# Patient Record
Sex: MALE | Race: WHITE | NOT HISPANIC OR LATINO | Employment: FULL TIME | URBAN - METROPOLITAN AREA
[De-identification: names, ages, dates, MRNs, and addresses within clinical notes are randomized per-mention and may not be internally consistent; named-entity substitution may affect disease eponyms.]

---

## 2019-07-25 ENCOUNTER — OFFICE VISIT (OUTPATIENT)
Dept: INTERNAL MEDICINE | Facility: CLINIC | Age: 34
End: 2019-07-25
Payer: COMMERCIAL

## 2019-07-25 ENCOUNTER — LAB VISIT (OUTPATIENT)
Dept: LAB | Facility: HOSPITAL | Age: 34
End: 2019-07-25
Attending: FAMILY MEDICINE
Payer: COMMERCIAL

## 2019-07-25 VITALS
TEMPERATURE: 97 F | DIASTOLIC BLOOD PRESSURE: 88 MMHG | BODY MASS INDEX: 46.65 KG/M2 | HEART RATE: 66 BPM | SYSTOLIC BLOOD PRESSURE: 128 MMHG | HEIGHT: 69 IN | WEIGHT: 315 LBS

## 2019-07-25 DIAGNOSIS — I10 HYPERTENSION, UNSPECIFIED TYPE: ICD-10-CM

## 2019-07-25 DIAGNOSIS — R06.83 SNORING: ICD-10-CM

## 2019-07-25 DIAGNOSIS — R06.81 APNEA: ICD-10-CM

## 2019-07-25 DIAGNOSIS — Z00.00 ROUTINE GENERAL MEDICAL EXAMINATION AT A HEALTH CARE FACILITY: ICD-10-CM

## 2019-07-25 DIAGNOSIS — Z00.00 ROUTINE GENERAL MEDICAL EXAMINATION AT A HEALTH CARE FACILITY: Primary | ICD-10-CM

## 2019-07-25 LAB
ALBUMIN SERPL BCP-MCNC: 4 G/DL (ref 3.5–5.2)
ALBUMIN/CREAT UR: 3.4 UG/MG (ref 0–30)
ALP SERPL-CCNC: 93 U/L (ref 55–135)
ALT SERPL W/O P-5'-P-CCNC: 80 U/L (ref 10–44)
ANION GAP SERPL CALC-SCNC: 6 MMOL/L (ref 8–16)
AST SERPL-CCNC: 59 U/L (ref 10–40)
BASOPHILS # BLD AUTO: 0.07 K/UL (ref 0–0.2)
BASOPHILS NFR BLD: 1.1 % (ref 0–1.9)
BILIRUB SERPL-MCNC: 0.8 MG/DL (ref 0.1–1)
BUN SERPL-MCNC: 10 MG/DL (ref 6–20)
CALCIUM SERPL-MCNC: 9.4 MG/DL (ref 8.7–10.5)
CHLORIDE SERPL-SCNC: 105 MMOL/L (ref 95–110)
CHOLEST SERPL-MCNC: 191 MG/DL (ref 120–199)
CHOLEST/HDLC SERPL: 4.7 {RATIO} (ref 2–5)
CO2 SERPL-SCNC: 30 MMOL/L (ref 23–29)
CREAT SERPL-MCNC: 0.9 MG/DL (ref 0.5–1.4)
CREAT UR-MCNC: 207 MG/DL (ref 23–375)
DIFFERENTIAL METHOD: ABNORMAL
EOSINOPHIL # BLD AUTO: 0.1 K/UL (ref 0–0.5)
EOSINOPHIL NFR BLD: 1.4 % (ref 0–8)
ERYTHROCYTE [DISTWIDTH] IN BLOOD BY AUTOMATED COUNT: 11.9 % (ref 11.5–14.5)
EST. GFR  (AFRICAN AMERICAN): >60 ML/MIN/1.73 M^2
EST. GFR  (NON AFRICAN AMERICAN): >60 ML/MIN/1.73 M^2
ESTIMATED AVG GLUCOSE: 131 MG/DL (ref 68–131)
GLUCOSE SERPL-MCNC: 84 MG/DL (ref 70–110)
HBA1C MFR BLD HPLC: 6.2 % (ref 4–5.6)
HCT VFR BLD AUTO: 47 % (ref 40–54)
HDLC SERPL-MCNC: 41 MG/DL (ref 40–75)
HDLC SERPL: 21.5 % (ref 20–50)
HGB BLD-MCNC: 15.6 G/DL (ref 14–18)
IMM GRANULOCYTES # BLD AUTO: 0.01 K/UL (ref 0–0.04)
IMM GRANULOCYTES NFR BLD AUTO: 0.2 % (ref 0–0.5)
LDLC SERPL CALC-MCNC: 127 MG/DL (ref 63–159)
LYMPHOCYTES # BLD AUTO: 2 K/UL (ref 1–4.8)
LYMPHOCYTES NFR BLD: 30.7 % (ref 18–48)
MCH RBC QN AUTO: 31.6 PG (ref 27–31)
MCHC RBC AUTO-ENTMCNC: 33.2 G/DL (ref 32–36)
MCV RBC AUTO: 95 FL (ref 82–98)
MICROALBUMIN UR DL<=1MG/L-MCNC: 7 UG/ML
MONOCYTES # BLD AUTO: 0.5 K/UL (ref 0.3–1)
MONOCYTES NFR BLD: 7.5 % (ref 4–15)
NEUTROPHILS # BLD AUTO: 3.9 K/UL (ref 1.8–7.7)
NEUTROPHILS NFR BLD: 59.1 % (ref 38–73)
NONHDLC SERPL-MCNC: 150 MG/DL
NRBC BLD-RTO: 0 /100 WBC
PLATELET # BLD AUTO: 234 K/UL (ref 150–350)
PMV BLD AUTO: 11.1 FL (ref 9.2–12.9)
POTASSIUM SERPL-SCNC: 4 MMOL/L (ref 3.5–5.1)
PROT SERPL-MCNC: 7.6 G/DL (ref 6–8.4)
RBC # BLD AUTO: 4.93 M/UL (ref 4.6–6.2)
SODIUM SERPL-SCNC: 141 MMOL/L (ref 136–145)
T4 FREE SERPL-MCNC: 1.03 NG/DL (ref 0.71–1.51)
TRIGL SERPL-MCNC: 115 MG/DL (ref 30–150)
TSH SERPL DL<=0.005 MIU/L-ACNC: 1.24 UIU/ML (ref 0.4–4)
WBC # BLD AUTO: 6.54 K/UL (ref 3.9–12.7)

## 2019-07-25 PROCEDURE — 83036 HEMOGLOBIN GLYCOSYLATED A1C: CPT

## 2019-07-25 PROCEDURE — 99999 PR PBB SHADOW E&M-NEW PATIENT-LVL III: ICD-10-PCS | Mod: PBBFAC,,, | Performed by: FAMILY MEDICINE

## 2019-07-25 PROCEDURE — 80053 COMPREHEN METABOLIC PANEL: CPT

## 2019-07-25 PROCEDURE — 84439 ASSAY OF FREE THYROXINE: CPT

## 2019-07-25 PROCEDURE — 99999 PR PBB SHADOW E&M-NEW PATIENT-LVL III: CPT | Mod: PBBFAC,,, | Performed by: FAMILY MEDICINE

## 2019-07-25 PROCEDURE — 85025 COMPLETE CBC W/AUTO DIFF WBC: CPT

## 2019-07-25 PROCEDURE — 84443 ASSAY THYROID STIM HORMONE: CPT

## 2019-07-25 PROCEDURE — 80061 LIPID PANEL: CPT

## 2019-07-25 PROCEDURE — 99385 PR PREVENTIVE VISIT,NEW,18-39: ICD-10-PCS | Mod: S$GLB,,, | Performed by: FAMILY MEDICINE

## 2019-07-25 PROCEDURE — 99385 PREV VISIT NEW AGE 18-39: CPT | Mod: S$GLB,,, | Performed by: FAMILY MEDICINE

## 2019-07-25 PROCEDURE — 82043 UR ALBUMIN QUANTITATIVE: CPT

## 2019-07-25 PROCEDURE — 36415 COLL VENOUS BLD VENIPUNCTURE: CPT | Mod: PO

## 2019-07-25 PROCEDURE — 83525 ASSAY OF INSULIN: CPT

## 2019-07-25 NOTE — PATIENT INSTRUCTIONS
Exercise for a Healthier Heart  You may wonder how you can improve the health of your heart. If youre thinking about exercise, youre on the right track. You dont need to become an athlete, but you do need a certain amount of brisk exercise to help strengthen your heart. If you have been diagnosed with a heart condition, your doctor may recommend exercise to help stabilize your condition. To help make exercise a habit, choose safe, fun activities.     Exercise with a friend. When activity is fun, you're more likely to stick with it.     Be sure to check with your healthcare provider before starting an exercise program.   Why exercise?  Exercising regularly offers many healthy rewards. It can help you do all of the following:  · Improve your blood cholesterol level to help prevent further heart trouble  · Lower your blood pressure to help prevent a stroke or heart attack  · Control diabetes, or reduce your risk of getting this disease  · Improve your heart and lung function  · Reach and maintain a healthy weight  · Make your muscles stronger and more limber so you can stay active  · Prevent falls and fractures by slowing the loss of bone mass (osteoporosis)  · Manage stress better  · Reduce your blood pressure  · Improve your sense of self and your body image  Exercise tips  Ease into your routine. Set small goals. Then build on them.  Exercise on most days. Aim for a total of 150 or more minutes of moderate to  vigorous intensity activity each week. Consider 40 minutes, 3 to 4 times a week. For best results, activity should last for 40 minutes on average. It is OK to work up to the 40 minute period over time. Examples of moderate-intensity activity is walking 1 mile in 15 minutes or 30 to 45 minutes of yard work.  Step up your daily activity level. Along with your exercise program, try being more active throughout the day. Walk instead of drive. Do more household tasks or yard work.  Choose one or more  activities you enjoy. Walking is one of the easiest things you can do. You can also try swimming, riding a bike, dancing, or taking an exercise class.  Stop exercising and call your doctor if you:  · Have chest pain or feel dizzy or lightheaded  · Feel burning, tightness, pressure, or heaviness in your chest, neck, shoulders, back, or arms  · Have unusual shortness of breath  · Have increased joint or muscle pain  · Have palpitations or an irregular heartbeat   Date Last Reviewed: 5/1/2016 © 2000-2017 [employer]. [address] [place] [address]. All rights reserved. This information is not intended as a substitute for professional medical care. Always follow your healthcare professional's instructions.        Eating Heart-Healthy Foods  Eating has a big impact on your heart health. In fact, eating healthier can improve several of your heart risks at once. For instance, it helps you manage weight, cholesterol, and blood pressure. Here are ideas to help you make heart-healthy changes without giving up all the foods and flavors you love.  Getting started  · Talk with your health care provider about eating plans, such as the DASH or Mediterranean diet. You may also be referred to a dietitian.  · Change a few things at a time. Give yourself time to get used to a few eating changes before adding more.  · Work to create a tasty, healthy eating plan that you can stick to for the rest of your life.    Goals for healthy eating  Below are some tips to improve your eating habits:  · Limit saturated fats and trans fats. Saturated fats raise your levels of cholesterol, so keep these fats to a minimum. They are found in foods such as fatty meats, whole milk, cheese, and palm and coconut oils. Avoid trans fats because they lower good cholesterol as well as raise bad cholesterol. Trans fats are most often found in processed foods.  · Reduce sodium (salt) intake. Eating too much salt may increase your blood  pressure. Limit your sodium intake to 2,300 milligrams (mg) per day, or less if your health care provider recommends it. Dining out less often and eating fewer processed foods are two great ways to decrease the amount of salt you consume.  · Managing calories. A calorie is a unit of energy. Your body burns calories for fuel, but if you eat more calories than your body burns, the extras are stored as fat. Your health care provider can help you create a diet plan to manage your calories. This will likely include eating healthier foods as well as exercising regularly. To help you track your progress, keep a diary to record what you eat and how often you exercise.  Choose the right foods  Aim to make these foods staples of your diet. If you have diabetes, you may have different recommendations than what is listed here:  · Fruits and vegetable provide plenty of nutrients without a lot of calories. At meals, fill half your plate with these foods. Split the other half of your plate between whole grains and lean protein.  · Whole grains are high in fiber and rich in vitamins and nutrients. Good choices include whole-wheat bread, pasta, and brown rice.  · Lean proteins give you nutrition with less fat. Good choices include fish, skinless chicken, and beans.  · Low-fat or nonfat dairy provides nutrients without a lot of fat. Try low-fat or nonfat milk, cheese, or yogurt.  · Healthy fats can be good for you in small amounts. These are unsaturated fats, such as olive oil, nuts, and fish. Try to have at least 2 servings per week of fatty fish such as salmon, sardines, mackerel, rainbow trout, and albacore tuna. These contain omega-3 fatty acids, which are good for your heart. Flaxseed is another source of a heart-healthy fat.  More on heart healthy eating    Read food labels  Healthy eating starts at the grocery store. Be sure to pay attention to food labels on packaged foods. Look for products that are high in fiber and  protein, and low in saturated fat, cholesterol, and sodium. Avoid products that contain trans fat. And pay close attention to serving size. For instance, if you plan to eat two servings, double all the numbers on the label.  Prepare food right  A key part of healthy cooking is cutting down on added fat and salt. Look on the internet for lower-fat, lower-sodium recipes. Also, try these tips:  · Remove fat from meat and skin from poultry before cooking.  · Skim fat from the surface of soups and sauces.  · Broil, boil, bake, steam, grill, and microwave food without added fats.  · Choose ingredients that spice up your food without adding calories, fat, or sodium. Try these items: horseradish, hot sauce, lemon, mustard, nonfat salad dressings, and vinegar. For salt-free herbs and spices, try basil, cilantro, cinnamon, pepper, and rosemary.  Date Last Reviewed: 6/25/2015 © 2000-2017 [employer]. [address] [place] [address], [place] [address]. All rights reserved. This information is not intended as a substitute for professional medical care. Always follow your healthcare professional's instructions.

## 2019-07-25 NOTE — PROGRESS NOTES
Subjective:      Patient ID: Jamarcus Dorsey is a 33 y.o. male.    Chief Complaint: Establish Care and Hypertension (Checked on auto machine at pharmacy)    Disclaimer:  This note is prepared using voice recognition software and as such is likely to have errors and has not been proof read. Please contact me for questions.     Jamarcus Dorsey is a 33 y.o. male who presents today to establish care. Works at pharmacy. Went to recalibrate the bp machine at work and then came on in to get it checked out.     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 Dad is Chace Dorsey.  Reports that he did have 1 reading of systolic 135 in even a reading at 1:40 a.m. in the past.  Not currently on blood pressure medicine.  Reports was around 180 lb when he graduated high school.  Not very motivated to do exercise her lifestyle changes.  Eats a lot of fast food.  Likes to play video games.  Please sleep aid the trumpet.  Has a treadmill at home.  No current history of diabetes per the patient but does have strong family history of diabetes.  Father reports that he has seen him have apneas and sleep issues.  Reports very difficult for him to wake up in the morning.  Hard to get out of bed.  Easily snores and likes to nap in the afternoon if available.  Father has sleep apnea.  Scored 8/18 on [place] scale.  BMI at 48.  Neck circumference at 19-1/4 inches    Plans on doing lasik eye surgery in sept 2019.               No results found for: WBC, HGB, HCT, PLT, CHOL, TRIG, HDL, LDLDIRECT, ALT, AST, NA, K, CL, CREATININE, BUN, CO2, TSH, PSA, INR, GLUF, HGBA1C, MICROALBUR    No image results found.        Review of Systems   Constitutional: Positive for activity change, appetite change and fatigue. Negative for chills and unexpected weight change.   HENT: Negative for congestion, ear pain, postnasal drip, sneezing, sore throat and trouble swallowing.    Eyes: Negative for pain and visual disturbance.   Respiratory: Negative for  "cough and shortness of breath.    Cardiovascular: Negative for chest pain and leg swelling.   Gastrointestinal: Negative for abdominal pain, constipation, diarrhea, nausea and vomiting.   Endocrine: Negative for cold intolerance and heat intolerance.   Genitourinary: Negative for difficulty urinating, dysuria and flank pain.   Musculoskeletal: Negative for arthralgias, back pain, joint swelling and neck pain.   Skin: Negative for color change and rash.   Neurological: Negative for dizziness, seizures and headaches.   Psychiatric/Behavioral: Positive for sleep disturbance. Negative for behavioral problems and dysphoric mood. The patient is not nervous/anxious.      Objective:     Vitals:    07/25/19 1420 07/25/19 1455   BP: 100/68 128/88   Pulse: 66    Temp: 97 °F (36.1 °C)    Weight: (!) 148.6 kg (327 lb 9.7 oz)    Height: 5' 9" (1.753 m)      Physical Exam   Constitutional: He is oriented to person, place, and time. He appears well-developed and well-nourished. No distress.   Morbid obese white male   HENT:   Head: Normocephalic and atraumatic.   Right Ear: Tympanic membrane and external ear normal.   Left Ear: Tympanic membrane and external ear normal.   Nose: Nose normal.   Mouth/Throat: Oropharynx is clear and moist.   Eyes: Pupils are equal, round, and reactive to light. EOM are normal.   Neck: Normal range of motion. Neck supple. Carotid bruit is not present. No thyroid mass and no thyromegaly present.       Cardiovascular: Normal rate and regular rhythm. Exam reveals no gallop and no friction rub.   No murmur heard.  Pulmonary/Chest: Effort normal and breath sounds normal. No respiratory distress.   Abdominal: Soft. Bowel sounds are normal. He exhibits no distension. There is no tenderness. There is no rebound.   Musculoskeletal: Normal range of motion.   Lymphadenopathy:     He has no cervical adenopathy.   Neurological: He is alert and oriented to person, place, and time. Coordination normal.   Skin: Skin " is warm and dry.   Psychiatric: He has a normal mood and affect. His speech is normal and behavior is normal. Judgment and thought content normal. Cognition and memory are normal.   Vitals reviewed.    Assessment:     1. Routine general medical examination at a health care facility    2. Hypertension, unspecified type    3. Apnea    4. Snoring      Plan:   Jamarcus was seen today for establish care and hypertension.    Diagnoses and all orders for this visit:    Routine general medical examination at a health care facility-establish care discussed diet exercise lifestyle changes weight loss discussed secondary hypertension discussed food choices.  Obtain lab work today.  Screen for diabetes screen for kidney issues.  Screen for insulin resistance.  -     TSH; Future  -     T4, free; Future  -     Lipid panel; Future  -     Comprehensive metabolic panel; Future  -     CBC auto differential; Future  -     Microalbumin/creatinine urine ratio  -     Hemoglobin A1c; Future  -     Insulin, random; Future    Hypertension, unspecified type-discussed lifestyle modification at this time due to blood pressure readings also weight reduction given information on dash diet.  -     TSH; Future  -     T4, free; Future  -     Lipid panel; Future  -     Comprehensive metabolic panel; Future  -     CBC auto differential; Future  -     Home Sleep Studies; Future  -     Microalbumin/creatinine urine ratio  -     Hemoglobin A1c; Future  -     Insulin, random; Future    Apnea witnessed by his father [place] score 8/18.  Will send in set up for home sleep study discussed lifestyle changes including weight loss diet exercise sleeping position changes  -     TSH; Future  -     T4, free; Future  -     Lipid panel; Future  -     Comprehensive metabolic panel; Future  -     CBC auto differential; Future  -     Home Sleep Studies; Future  -     Microalbumin/creatinine urine ratio  -     Hemoglobin A1c; Future  -     Insulin, random;  Future    Snoring witnessed by his father [place] score 8/18.  Will send in set up for home sleep study discussed lifestyle changes including weight loss diet exercise sleeping position changes  -     TSH; Future  -     T4, free; Future  -     Lipid panel; Future  -     Comprehensive metabolic panel; Future  -     CBC auto differential; Future  -     Home Sleep Studies; Future  -     Microalbumin/creatinine urine ratio  -     Hemoglobin A1c; Future  -     Insulin, random; Future        Morbid obesity- bmi at 46    Follow up in about 7 weeks (around 9/11/2019) for F/u WT, Labs, Meds Dr Bond.    Patient Instructions       Exercise for a Healthier Heart  You may wonder how you can improve the health of your heart. If youre thinking about exercise, youre on the right track. You dont need to become an athlete, but you do need a certain amount of brisk exercise to help strengthen your heart. If you have been diagnosed with a heart condition, your doctor may recommend exercise to help stabilize your condition. To help make exercise a habit, choose safe, fun activities.     Exercise with a friend. When activity is fun, you're more likely to stick with it.     Be sure to check with your healthcare provider before starting an exercise program.   Why exercise?  Exercising regularly offers many healthy rewards. It can help you do all of the following:  · Improve your blood cholesterol level to help prevent further heart trouble  · Lower your blood pressure to help prevent a stroke or heart attack  · Control diabetes, or reduce your risk of getting this disease  · Improve your heart and lung function  · Reach and maintain a healthy weight  · Make your muscles stronger and more limber so you can stay active  · Prevent falls and fractures by slowing the loss of bone mass (osteoporosis)  · Manage stress better  · Reduce your blood pressure  · Improve your sense of self and your body image  Exercise tips  Ease into your routine.  Set small goals. Then build on them.  Exercise on most days. Aim for a total of 150 or more minutes of moderate to  vigorous intensity activity each week. Consider 40 minutes, 3 to 4 times a week. For best results, activity should last for 40 minutes on average. It is OK to work up to the 40 minute period over time. Examples of moderate-intensity activity is walking 1 mile in 15 minutes or 30 to 45 minutes of yard work.  Step up your daily activity level. Along with your exercise program, try being more active throughout the day. Walk instead of drive. Do more household tasks or yard work.  Choose one or more activities you enjoy. Walking is one of the easiest things you can do. You can also try swimming, riding a bike, dancing, or taking an exercise class.  Stop exercising and call your doctor if you:  · Have chest pain or feel dizzy or lightheaded  · Feel burning, tightness, pressure, or heaviness in your chest, neck, shoulders, back, or arms  · Have unusual shortness of breath  · Have increased joint or muscle pain  · Have palpitations or an irregular heartbeat   Date Last Reviewed: 5/1/2016  © 5409-0146 [employer]. [address] [place] [address]. All rights reserved. This information is not intended as a substitute for professional medical care. Always follow your healthcare professional's instructions.        Eating Heart-Healthy Foods  Eating has a big impact on your heart health. In fact, eating healthier can improve several of your heart risks at once. For instance, it helps you manage weight, cholesterol, and blood pressure. Here are ideas to help you make heart-healthy changes without giving up all the foods and flavors you love.  Getting started  · Talk with your health care provider about eating plans, such as the DASH or Mediterranean diet. You may also be referred to a dietitian.  · Change a few things at a time. Give yourself time to get used to a few eating changes before  adding more.  · Work to create a tasty, healthy eating plan that you can stick to for the rest of your life.    Goals for healthy eating  Below are some tips to improve your eating habits:  · Limit saturated fats and trans fats. Saturated fats raise your levels of cholesterol, so keep these fats to a minimum. They are found in foods such as fatty meats, whole milk, cheese, and palm and coconut oils. Avoid trans fats because they lower good cholesterol as well as raise bad cholesterol. Trans fats are most often found in processed foods.  · Reduce sodium (salt) intake. Eating too much salt may increase your blood pressure. Limit your sodium intake to 2,300 milligrams (mg) per day, or less if your health care provider recommends it. Dining out less often and eating fewer processed foods are two great ways to decrease the amount of salt you consume.  · Managing calories. A calorie is a unit of energy. Your body burns calories for fuel, but if you eat more calories than your body burns, the extras are stored as fat. Your health care provider can help you create a diet plan to manage your calories. This will likely include eating healthier foods as well as exercising regularly. To help you track your progress, keep a diary to record what you eat and how often you exercise.  Choose the right foods  Aim to make these foods staples of your diet. If you have diabetes, you may have different recommendations than what is listed here:  · Fruits and vegetable provide plenty of nutrients without a lot of calories. At meals, fill half your plate with these foods. Split the other half of your plate between whole grains and lean protein.  · Whole grains are high in fiber and rich in vitamins and nutrients. Good choices include whole-wheat bread, pasta, and brown rice.  · Lean proteins give you nutrition with less fat. Good choices include fish, skinless chicken, and beans.  · Low-fat or nonfat dairy provides nutrients without a lot  of fat. Try low-fat or nonfat milk, cheese, or yogurt.  · Healthy fats can be good for you in small amounts. These are unsaturated fats, such as olive oil, nuts, and fish. Try to have at least 2 servings per week of fatty fish such as salmon, sardines, mackerel, rainbow trout, and albacore tuna. These contain omega-3 fatty acids, which are good for your heart. Flaxseed is another source of a heart-healthy fat.  More on heart healthy eating    Read food labels  Healthy eating starts at the grocery store. Be sure to pay attention to food labels on packaged foods. Look for products that are high in fiber and protein, and low in saturated fat, cholesterol, and sodium. Avoid products that contain trans fat. And pay close attention to serving size. For instance, if you plan to eat two servings, double all the numbers on the label.  Prepare food right  A key part of healthy cooking is cutting down on added fat and salt. Look on the internet for lower-fat, lower-sodium recipes. Also, try these tips:  · Remove fat from meat and skin from poultry before cooking.  · Skim fat from the surface of soups and sauces.  · Broil, boil, bake, steam, grill, and microwave food without added fats.  · Choose ingredients that spice up your food without adding calories, fat, or sodium. Try these items: horseradish, hot sauce, lemon, mustard, nonfat salad dressings, and vinegar. For salt-free herbs and spices, try basil, cilantro, cinnamon, pepper, and rosemary.  Date Last Reviewed: 6/25/2015  © 5860-3844 [employer]. [address] [place] [address], [place] [address]. All rights reserved. This information is not intended as a substitute for professional medical care. Always follow your healthcare professional's instructions.

## 2019-07-26 ENCOUNTER — TELEPHONE (OUTPATIENT)
Dept: PULMONOLOGY | Facility: CLINIC | Age: 34
End: 2019-07-26

## 2019-07-26 LAB
INSULIN COLLECTION INTERVAL: NORMAL
INSULIN SERPL-ACNC: 16.6 UU/ML

## 2019-07-30 ENCOUNTER — PATIENT MESSAGE (OUTPATIENT)
Dept: INTERNAL MEDICINE | Facility: CLINIC | Age: 34
End: 2019-07-30

## 2019-07-30 DIAGNOSIS — R74.8 ELEVATED LIVER ENZYMES: Primary | ICD-10-CM

## 2019-07-30 DIAGNOSIS — R73.03 PREDIABETES: ICD-10-CM

## 2019-07-30 RX ORDER — METFORMIN HYDROCHLORIDE 500 MG/1
TABLET, EXTENDED RELEASE ORAL
Qty: 120 TABLET | Refills: 2 | Status: SHIPPED | OUTPATIENT
Start: 2019-07-30

## 2019-07-30 NOTE — TELEPHONE ENCOUNTER
Setup labs, start metformin. Keep appt for sept. Can provide information on low carb diets also or 1500 reema diet.

## 2019-07-31 ENCOUNTER — PATIENT MESSAGE (OUTPATIENT)
Dept: INTERNAL MEDICINE | Facility: CLINIC | Age: 34
End: 2019-07-31

## 2019-08-06 ENCOUNTER — TELEPHONE (OUTPATIENT)
Dept: RADIOLOGY | Facility: HOSPITAL | Age: 34
End: 2019-08-06

## 2019-08-07 ENCOUNTER — LAB VISIT (OUTPATIENT)
Dept: LAB | Facility: HOSPITAL | Age: 34
End: 2019-08-07
Attending: FAMILY MEDICINE
Payer: COMMERCIAL

## 2019-08-07 ENCOUNTER — HOSPITAL ENCOUNTER (OUTPATIENT)
Dept: RADIOLOGY | Facility: HOSPITAL | Age: 34
Discharge: HOME | End: 2019-08-07
Attending: FAMILY MEDICINE
Payer: COMMERCIAL

## 2019-08-07 DIAGNOSIS — R73.03 PREDIABETES: ICD-10-CM

## 2019-08-07 DIAGNOSIS — R74.8 ELEVATED LIVER ENZYMES: ICD-10-CM

## 2019-08-07 LAB
ALBUMIN SERPL BCP-MCNC: 4.1 G/DL (ref 3.5–5.2)
ALP SERPL-CCNC: 92 U/L (ref 55–135)
ALT SERPL W/O P-5'-P-CCNC: 82 U/L (ref 10–44)
AST SERPL-CCNC: 52 U/L (ref 10–40)
BILIRUB DIRECT SERPL-MCNC: 0.4 MG/DL (ref 0.1–0.3)
BILIRUB SERPL-MCNC: 0.7 MG/DL (ref 0.1–1)
GGT SERPL-CCNC: 69 U/L (ref 8–55)
PROT SERPL-MCNC: 7.9 G/DL (ref 6–8.4)

## 2019-08-07 PROCEDURE — 82977 ASSAY OF GGT: CPT

## 2019-08-07 PROCEDURE — 36415 COLL VENOUS BLD VENIPUNCTURE: CPT

## 2019-08-07 PROCEDURE — 80076 HEPATIC FUNCTION PANEL: CPT

## 2019-08-07 PROCEDURE — 80074 ACUTE HEPATITIS PANEL: CPT

## 2019-08-07 PROCEDURE — 76705 US ABDOMEN LIMITED: ICD-10-PCS | Mod: 26,,, | Performed by: RADIOLOGY

## 2019-08-07 PROCEDURE — 76705 ECHO EXAM OF ABDOMEN: CPT | Mod: TC

## 2019-08-07 PROCEDURE — 76705 ECHO EXAM OF ABDOMEN: CPT | Mod: 26,,, | Performed by: RADIOLOGY

## 2019-08-08 LAB
HAV IGM SERPL QL IA: NEGATIVE
HBV CORE IGM SERPL QL IA: NEGATIVE
HBV SURFACE AG SERPL QL IA: NEGATIVE
HCV AB SERPL QL IA: NEGATIVE

## 2019-09-10 ENCOUNTER — PROCEDURE VISIT (OUTPATIENT)
Dept: SLEEP MEDICINE | Facility: CLINIC | Age: 34
End: 2019-09-10
Payer: COMMERCIAL

## 2019-09-10 DIAGNOSIS — I10 HYPERTENSION, UNSPECIFIED TYPE: ICD-10-CM

## 2019-09-10 DIAGNOSIS — G47.33 OSA (OBSTRUCTIVE SLEEP APNEA): Primary | ICD-10-CM

## 2019-09-10 DIAGNOSIS — R06.81 APNEA: ICD-10-CM

## 2019-09-10 PROCEDURE — 99499 UNLISTED E&M SERVICE: CPT | Mod: S$GLB,,, | Performed by: INTERNAL MEDICINE

## 2019-09-10 PROCEDURE — 99499 NO LOS: ICD-10-PCS | Mod: S$GLB,,, | Performed by: INTERNAL MEDICINE

## 2019-09-10 PROCEDURE — 95806 SLEEP STUDY UNATT&RESP EFFT: CPT | Mod: 26,52,S$GLB, | Performed by: INTERNAL MEDICINE

## 2019-09-10 PROCEDURE — 95806 PR SLEEP STUDY, UNATTENDED, SIMUL RECORD HR/O2 SAT/RESP FLOW/RESP EFFT: ICD-10-PCS | Mod: 26,52,S$GLB, | Performed by: INTERNAL MEDICINE

## 2019-09-10 NOTE — Clinical Note
Assessment and Recommendations1 night studySEVERE OBSTRUCTIVE SLEEP APNEA with overall AHI 91.5/hr ( 389 events):Oxygen desaturation: 74%. SpO2 between 70% to 79% for 3 min.SpO2 was below 90% for 165 of the study timePatient snored 100% time above 50 .Heart rate range: 51 bpm - 100 bpmREC's:INLAB CPAP titration recommended.Therapy with APAP at 6-20 cm WP using mask of choice with heated humidification is an option.Referred to [facility] for prompt evaluation and managementWeight loss/management. with regular exercise per direction of physician.Avoid drowsy driving.Follow up in sleep clinic to maximize adherence and ensure resolution of symptoms.

## 2019-09-10 NOTE — PROCEDURES
Home Sleep Studies  Date/Time: 9/10/2019 2:21 PM  Performed by: Orlando Russell MD  Authorized by: Ibeth Bond MD       Assessment and Recommendations  1 night study  SEVERE OBSTRUCTIVE SLEEP APNEA with overall AHI 91.5/hr ( 389 events):  Oxygen desaturation: 74%. SpO2 between 70% to 79% for 3 min.  SpO2 was below 90% for 165 of the study time  Patient snored 100% time above 50 .  Heart rate range: 51 bpm - 100 bpm  REC's:  INLAB CPAP titration recommended.  Therapy with APAP at 6-20 cm WP using mask of choice with heated humidification is an option.  Referred to [facility] for prompt evaluation and management  Weight loss/management. with regular exercise per direction of physician.  Avoid drowsy driving.  Follow up in sleep clinic to maximize adherence and ensure resolution of symptoms.

## 2019-09-12 ENCOUNTER — TELEPHONE (OUTPATIENT)
Dept: PULMONOLOGY | Facility: CLINIC | Age: 34
End: 2019-09-12

## 2019-09-12 ENCOUNTER — OFFICE VISIT (OUTPATIENT)
Dept: INTERNAL MEDICINE | Facility: CLINIC | Age: 34
End: 2019-09-12
Payer: COMMERCIAL

## 2019-09-12 VITALS
HEIGHT: 69 IN | BODY MASS INDEX: 46.53 KG/M2 | DIASTOLIC BLOOD PRESSURE: 78 MMHG | HEART RATE: 82 BPM | TEMPERATURE: 98 F | WEIGHT: 314.13 LBS | SYSTOLIC BLOOD PRESSURE: 110 MMHG

## 2019-09-12 DIAGNOSIS — G47.33 CHRONIC INTERMITTENT HYPOXIA WITH OBSTRUCTIVE SLEEP APNEA: ICD-10-CM

## 2019-09-12 DIAGNOSIS — G47.34 CHRONIC INTERMITTENT HYPOXIA WITH OBSTRUCTIVE SLEEP APNEA: ICD-10-CM

## 2019-09-12 DIAGNOSIS — R73.03 PREDIABETES: ICD-10-CM

## 2019-09-12 DIAGNOSIS — K76.0 FATTY LIVER DISEASE, NONALCOHOLIC: Primary | ICD-10-CM

## 2019-09-12 DIAGNOSIS — E88.819 INSULIN RESISTANCE: ICD-10-CM

## 2019-09-12 DIAGNOSIS — E66.2 MORBID OBESITY WITH ALVEOLAR HYPOVENTILATION: ICD-10-CM

## 2019-09-12 PROCEDURE — 3008F PR BODY MASS INDEX (BMI) DOCUMENTED: ICD-10-PCS | Mod: CPTII,S$GLB,, | Performed by: FAMILY MEDICINE

## 2019-09-12 PROCEDURE — 99215 OFFICE O/P EST HI 40 MIN: CPT | Mod: S$GLB,,, | Performed by: FAMILY MEDICINE

## 2019-09-12 PROCEDURE — 99215 PR OFFICE/OUTPT VISIT, EST, LEVL V, 40-54 MIN: ICD-10-PCS | Mod: S$GLB,,, | Performed by: FAMILY MEDICINE

## 2019-09-12 PROCEDURE — 99999 PR PBB SHADOW E&M-EST. PATIENT-LVL III: ICD-10-PCS | Mod: PBBFAC,,, | Performed by: FAMILY MEDICINE

## 2019-09-12 PROCEDURE — 99999 PR PBB SHADOW E&M-EST. PATIENT-LVL III: CPT | Mod: PBBFAC,,, | Performed by: FAMILY MEDICINE

## 2019-09-12 PROCEDURE — 3008F BODY MASS INDEX DOCD: CPT | Mod: CPTII,S$GLB,, | Performed by: FAMILY MEDICINE

## 2019-09-12 NOTE — PROGRESS NOTES
Subjective:      Patient ID: Jamarcus Dorsey is a 33 y.o. male.    Chief Complaint: Annual Exam    Disclaimer:  This note is prepared using voice recognition software and as such is likely to have errors and has not been proof read. Please contact me for questions.     Jamarcus Dorsey is a 33 y.o. male who presents today to for one-month follow-up after stab wishing care.  On his last visit we did lab work as well as refer him for sleep study.  I reviewed the sleep study in great detail with him today.  He had severe obstructive sleep apnea.  He is due to proceed forward with doing CPAP titration.  He was thinking he may not go through with it however upon reviewing this sleep study he is willing to possibly entertain the idea of doing his CPAP.  He does note that when he does not sleep well quit he eats worse during the day.  He is having significant then every hour with some desaturations in to the lower 70s predominantly throughout the evening.    He also does have insulin resistance with prediabetes.  He has lost 13 lb since our last visit.  Diet is not any healthier but trying to make some simple changes.  He is now Doing metformin now at 2 in am and 2 in pm.  At this time he would not be a candidate for Adipex due to his severe obstructive sleep apnea.    He also has elevated liver enzymes for which I did additional blood work.  This was a new finding.  We also did a liver ultrasound which showed consistency with fatty infiltration throughout the liver.  For this reason we did discuss additional need for weight loss.    We also reviewed his cholesterol profile kidney functions blood counts and thyroid studies.    Wt Readings from Last 10 Encounters:  09/12/19 : (!) 142.5 kg (314 lb 2.5 oz)  07/25/19 : (!) 148.6 kg (327 lb 9.7 oz)  08/27/14 : 132.9 kg (293 lb)      Plans on doing lasik eye surgery in sept 2019.               Lab Results   Component Value Date    WBC 6.54 07/25/2019    HGB 15.6 07/25/2019     HCT 47.0 07/25/2019     07/25/2019    CHOL 191 07/25/2019    TRIG 115 07/25/2019    HDL 41 07/25/2019    ALT 82 (H) 08/07/2019    AST 52 (H) 08/07/2019     07/25/2019    K 4.0 07/25/2019     07/25/2019    CREATININE 0.9 07/25/2019    BUN 10 07/25/2019    CO2 30 (H) 07/25/2019    TSH 1.240 07/25/2019    HGBA1C 6.2 (H) 07/25/2019       US Abdomen Limited  Narrative: EXAMINATION:  US ABDOMEN LIMITED    CLINICAL HISTORY:  Abnormal levels of other serum enzymes    TECHNIQUE:  Limited ultrasound of the right upper quadrant of the abdomen (including pancreas, liver, gallbladder, common bile duct, and right kidney) was performed.    COMPARISON:  None    FINDINGS:  Liver: Normal in size measuring 16.3 cm. The liver demonstrates homogeneously increased in echotexture most consistent with diffuse fatty infiltration.  No focal hepatic lesions are seen.    Biliary system: The gallbladder demonstrates no evidence of calculi. No gallbladder wall thickening.  No sonographic Richards sign. No pericholecystic fluid. The common duct is not dilated, measuring 3.4 mm. No intrahepatic ductal dilatation.    Pancreas: The visualized portions of pancreas appear normal    Right kidney: Normal in size measuring 11.3 with no hydronephrosis.    Miscellaneous: No ascites.  Impression: 1. Diffuse Fatty infiltration of the liver.  .    Electronically signed by: Messi Najera DO  Date:    08/07/2019  Time:    11:21        Review of Systems   Constitutional: Positive for activity change and appetite change. Negative for chills, fatigue and unexpected weight change.   HENT: Negative for sore throat and trouble swallowing.    Respiratory: Negative for cough and shortness of breath.    Cardiovascular: Negative for chest pain and leg swelling.   Gastrointestinal: Negative for abdominal distention, abdominal pain, constipation, diarrhea and nausea.   Genitourinary: Negative for difficulty urinating, dysuria and flank pain.  "  Musculoskeletal: Negative for arthralgias and joint swelling.   Neurological: Negative for dizziness and headaches.   Psychiatric/Behavioral: Positive for decreased concentration and sleep disturbance. Negative for dysphoric mood.     Objective:     Vitals:    09/12/19 1502   BP: 110/78   Pulse: 82   Temp: 98.4 °F (36.9 °C)   TempSrc: Oral   Weight: (!) 142.5 kg (314 lb 2.5 oz)   Height: 5' 9" (1.753 m)     Physical Exam   Constitutional: He is oriented to person, place, and time. He appears well-developed and well-nourished. No distress.   Morbid obese white male   HENT:   Head: Normocephalic and atraumatic.   Right Ear: External ear normal.   Left Ear: External ear normal.   Nose: Nose normal.   Mouth/Throat: Oropharynx is clear and moist.   Eyes: Pupils are equal, round, and reactive to light. EOM are normal.   Neck: Normal range of motion. Neck supple. No thyromegaly present.   Cardiovascular: Normal rate and regular rhythm. Exam reveals no gallop and no friction rub.   No murmur heard.  Pulmonary/Chest: Effort normal and breath sounds normal. No respiratory distress.   Abdominal: Soft. Bowel sounds are normal. He exhibits no distension. There is no tenderness. There is no rebound.   Musculoskeletal: Normal range of motion.   Lymphadenopathy:     He has no cervical adenopathy.   Neurological: He is alert and oriented to person, place, and time. Coordination normal.   Skin: Skin is warm and dry.   Psychiatric: He has a normal mood and affect.   Vitals reviewed.    Assessment:     1. Fatty liver disease, nonalcoholic    2. Prediabetes    3. Insulin resistance    4. Chronic intermittent hypoxia with obstructive sleep apnea    5. Morbid obesity with alveolar hypoventilation      Plan:   Jamarcus was seen today for annual exam.    Diagnoses and all orders for this visit:    Fatty liver disease, nonalcoholic-reviewed ultrasound liver testing at length risk factors in relation to cardiovascular events heart attack " strokes peripheral vascular disease as well as development of end-stage liver disease. Highly stressed about aggressive weight loss at this time.  Not a candidate phentermine.  Would highly encourage treating sleep apnea as well to give better energy and mood better sleep reduction stress on the body to help him also begin daily exercise as well as following a 1500 calorie diet  -     Lipid panel; Future  -     Hemoglobin A1c; Future  -     Insulin, random; Future  -     Comprehensive metabolic panel; Future    Prediabetes-starting metformin at this time discussed need for weight loss lower carbohydrate diet  -     Lipid panel; Future  -     Hemoglobin A1c; Future  -     Insulin, random; Future  -     Comprehensive metabolic panel; Future    Insulin resistance-starting metformin reviewed labs continue work on weight loss    Chronic intermittent hypoxia with obstructive sleep apnea-reviewed home sleep study.  Highly stressed importance need to pursue forward with doing CPAP titration    Morbid obesity with alveolar hypoventilation.  Needing aggressive weight loss as well as treatment for this CPAP this is new.    Time spent: 40 minutes in face to face discussion concerning diagnosis, prognosis, review of lab and test results, benefits of treatment as well as management of disease, counseling of patient and coordination of care between various health care providers . Greater than half the time spent was used for coordination of care and counseling of patient.           Follow up in about 8 weeks (around 11/7/2019) for telemed visit, fatty liver, insulin wt. .    There are no Patient Instructions on file for this visit.

## 2019-09-14 PROBLEM — E88.819 INSULIN RESISTANCE: Status: ACTIVE | Noted: 2019-09-14

## 2019-09-14 PROBLEM — K76.0 FATTY LIVER DISEASE, NONALCOHOLIC: Status: ACTIVE | Noted: 2019-09-14

## 2019-09-14 PROBLEM — R73.03 PREDIABETES: Status: ACTIVE | Noted: 2019-09-14

## 2019-09-14 PROBLEM — G47.33 CHRONIC INTERMITTENT HYPOXIA WITH OBSTRUCTIVE SLEEP APNEA: Status: ACTIVE | Noted: 2019-09-14

## 2019-09-14 PROBLEM — G47.34 CHRONIC INTERMITTENT HYPOXIA WITH OBSTRUCTIVE SLEEP APNEA: Status: ACTIVE | Noted: 2019-09-14

## 2019-09-14 PROBLEM — E66.2 MORBID OBESITY WITH ALVEOLAR HYPOVENTILATION: Status: ACTIVE | Noted: 2019-09-14

## 2019-11-06 ENCOUNTER — LAB VISIT (OUTPATIENT)
Dept: LAB | Facility: HOSPITAL | Age: 34
End: 2019-11-06
Attending: FAMILY MEDICINE
Payer: COMMERCIAL

## 2019-11-06 DIAGNOSIS — K76.0 FATTY LIVER DISEASE, NONALCOHOLIC: ICD-10-CM

## 2019-11-06 DIAGNOSIS — R73.03 PREDIABETES: ICD-10-CM

## 2019-11-06 LAB
ALBUMIN SERPL BCP-MCNC: 4.2 G/DL (ref 3.5–5.2)
ALP SERPL-CCNC: 92 U/L (ref 55–135)
ALT SERPL W/O P-5'-P-CCNC: 124 U/L (ref 10–44)
ANION GAP SERPL CALC-SCNC: 11 MMOL/L (ref 8–16)
AST SERPL-CCNC: 67 U/L (ref 10–40)
BILIRUB SERPL-MCNC: 1 MG/DL (ref 0.1–1)
BUN SERPL-MCNC: 9 MG/DL (ref 6–20)
CALCIUM SERPL-MCNC: 9.7 MG/DL (ref 8.7–10.5)
CHLORIDE SERPL-SCNC: 104 MMOL/L (ref 95–110)
CHOLEST SERPL-MCNC: 174 MG/DL (ref 120–199)
CHOLEST/HDLC SERPL: 4.2 {RATIO} (ref 2–5)
CO2 SERPL-SCNC: 26 MMOL/L (ref 23–29)
CREAT SERPL-MCNC: 1 MG/DL (ref 0.5–1.4)
EST. GFR  (AFRICAN AMERICAN): >60 ML/MIN/1.73 M^2
EST. GFR  (NON AFRICAN AMERICAN): >60 ML/MIN/1.73 M^2
GLUCOSE SERPL-MCNC: 79 MG/DL (ref 70–110)
HDLC SERPL-MCNC: 41 MG/DL (ref 40–75)
HDLC SERPL: 23.6 % (ref 20–50)
LDLC SERPL CALC-MCNC: 109.2 MG/DL (ref 63–159)
NONHDLC SERPL-MCNC: 133 MG/DL
POTASSIUM SERPL-SCNC: 4.3 MMOL/L (ref 3.5–5.1)
PROT SERPL-MCNC: 7.8 G/DL (ref 6–8.4)
SODIUM SERPL-SCNC: 141 MMOL/L (ref 136–145)
TRIGL SERPL-MCNC: 119 MG/DL (ref 30–150)

## 2019-11-06 PROCEDURE — 36415 COLL VENOUS BLD VENIPUNCTURE: CPT | Mod: PO

## 2019-11-06 PROCEDURE — 80053 COMPREHEN METABOLIC PANEL: CPT

## 2019-11-06 PROCEDURE — 80061 LIPID PANEL: CPT

## 2019-11-06 PROCEDURE — 83036 HEMOGLOBIN GLYCOSYLATED A1C: CPT

## 2019-11-06 PROCEDURE — 83525 ASSAY OF INSULIN: CPT

## 2019-11-07 ENCOUNTER — OFFICE VISIT (OUTPATIENT)
Dept: INTERNAL MEDICINE | Facility: CLINIC | Age: 34
End: 2019-11-07
Payer: COMMERCIAL

## 2019-11-07 ENCOUNTER — PATIENT MESSAGE (OUTPATIENT)
Dept: INTERNAL MEDICINE | Facility: CLINIC | Age: 34
End: 2019-11-07

## 2019-11-07 VITALS — WEIGHT: 298 LBS | HEIGHT: 69 IN | BODY MASS INDEX: 44.14 KG/M2

## 2019-11-07 DIAGNOSIS — E66.2 MORBID OBESITY WITH ALVEOLAR HYPOVENTILATION: ICD-10-CM

## 2019-11-07 DIAGNOSIS — R73.03 PREDIABETES: ICD-10-CM

## 2019-11-07 DIAGNOSIS — K76.0 FATTY LIVER DISEASE, NONALCOHOLIC: ICD-10-CM

## 2019-11-07 DIAGNOSIS — G47.34 CHRONIC INTERMITTENT HYPOXIA WITH OBSTRUCTIVE SLEEP APNEA: ICD-10-CM

## 2019-11-07 DIAGNOSIS — E88.819 INSULIN RESISTANCE: Primary | ICD-10-CM

## 2019-11-07 DIAGNOSIS — G47.33 CHRONIC INTERMITTENT HYPOXIA WITH OBSTRUCTIVE SLEEP APNEA: ICD-10-CM

## 2019-11-07 LAB
ESTIMATED AVG GLUCOSE: 108 MG/DL (ref 68–131)
HBA1C MFR BLD HPLC: 5.4 % (ref 4–5.6)
INSULIN COLLECTION INTERVAL: NORMAL
INSULIN SERPL-ACNC: 16.9 UU/ML

## 2019-11-07 PROCEDURE — 99214 OFFICE O/P EST MOD 30 MIN: CPT | Mod: 95,ICN,, | Performed by: FAMILY MEDICINE

## 2019-11-07 PROCEDURE — 99214 PR OFFICE/OUTPT VISIT, EST, LEVL IV, 30-39 MIN: ICD-10-PCS | Mod: 95,ICN,, | Performed by: FAMILY MEDICINE

## 2019-11-07 PROCEDURE — 3008F BODY MASS INDEX DOCD: CPT | Mod: CPTII,ICN,, | Performed by: FAMILY MEDICINE

## 2019-11-07 PROCEDURE — 3008F PR BODY MASS INDEX (BMI) DOCUMENTED: ICD-10-PCS | Mod: CPTII,ICN,, | Performed by: FAMILY MEDICINE

## 2019-11-07 NOTE — PROGRESS NOTES
Subjective:      Patient ID: Jamarcus Dorsey is a 34 y.o. male.    Chief Complaint: weight check, bp, labs    Disclaimer:  This note is prepared using voice recognition software and as such is likely to have errors and has not been proof read. Please contact me for questions.     Jamarcus Dorsey is a 33 y.o. male who presents today to for     telemed visit.     The patient location is:  Patient Home   The chief complaint leading to consultation is: weight, insulin resistance, fatty liver, nitish.   Visit type: Virtual visit with synchronous audio and video  Total time spent with patient: 3:52pm-4:17pm  Each patient to whom he or she provides medical services by telemedicine is:  (1) informed of the relationship between the physician and patient and the respective role of any other health care provider with respect to management of the patient; and (2) notified that he or she may decline to receive medical services by telemedicine and may withdraw from such care at any time.    Started the metformin.  Taking it 85% of the time.  Lost down to 298lbs on his scale ( at home which 3lbs).  Down 14lbs since last visit.     Did have some liver enzyme elevations though.     Did do lasix eye surgery.  Seems to be doing well.    Reports that he is definitely sleeping better per the patient.  Did get a fitbit . Kept breaking his sleep up every 2-3 hours at a time prior to him losing weight.  Now changed, showing 7-8 hours of sleep. Getting more sleep.  Staying up later now.  Harder to go to sleep.  Not as tired when bedtime comes around.  Doing some exercise. Doing some treadmill and dumbells.  Started at 10 min at a time, moved up to 12 min about 2 days a week.  In 2017 lost a few pounds with some exercise.  Lowest weight noted in our records was from an emergency room visit in 20 15 of 298.    Reports that mainly he limits his breakfast and his supper to around 200 calories each.  Lunchtime he still eating out a lot of  fast food.  Had a birthday yesterday end of eating it seems as well.  Does not really want to change a lot of his diet but is still steadily working on losing the weight.    Once again reviewed his sleep study.  Reports that someone reach out to him about contacting him to set it up about doing an in-house Sleep Lab but was unable to do so.  Is still needing to do a CPAP.  Per the home sleep study recommended doing on auto Pap anywhere from 6-20 cm of water with a heated device.  I am willing to place order now as well as a referral to see will go ahead and initiate this because I believe this will help him dramatically with his energy levels and fatigue as well.    His A1c is dramatically improved since his last visit.  Now in the 5s.  Tolerate metformin 2 pills twice a day.  Insulin levels are about the same.  Liver enzymes were elevated.  He felt like they would be lower with his weight loss however his diet at lunchtime still is very high in saturated fats.     At this time he would not be a candidate for Adipex due to his severe obstructive sleep apnea.    We once again reviewed his liver ultrasound which showed no evidence of a cyst or obstruction.  No evidence of gallstones at that time.  Cholesterol is still about the same triglycerides have decreased some but LDL is up slightly no change in the HDL.    Wt Readings from Last 4 Encounters:  11/07/19 : 135.2 kg (298 lb)  09/12/19 : (!) 142.5 kg (314 lb 2.5 oz)  07/25/19 : (!) 148.6 kg (327 lb 9.7 oz)  08/27/14 : 132.9 kg (293 lb)            Lab Results   Component Value Date    WBC 6.54 07/25/2019    HGB 15.6 07/25/2019    HCT 47.0 07/25/2019     07/25/2019    CHOL 174 11/06/2019    TRIG 119 11/06/2019    HDL 41 11/06/2019     (H) 11/06/2019    AST 67 (H) 11/06/2019     11/06/2019    K 4.3 11/06/2019     11/06/2019    CREATININE 1.0 11/06/2019    BUN 9 11/06/2019    CO2 26 11/06/2019    TSH 1.240 07/25/2019    HGBA1C 5.4 11/06/2019  "      US Abdomen Limited  Narrative: EXAMINATION:  US ABDOMEN LIMITED    CLINICAL HISTORY:  Abnormal levels of other serum enzymes    TECHNIQUE:  Limited ultrasound of the right upper quadrant of the abdomen (including pancreas, liver, gallbladder, common bile duct, and right kidney) was performed.    COMPARISON:  None    FINDINGS:  Liver: Normal in size measuring 16.3 cm. The liver demonstrates homogeneously increased in echotexture most consistent with diffuse fatty infiltration.  No focal hepatic lesions are seen.    Biliary system: The gallbladder demonstrates no evidence of calculi. No gallbladder wall thickening.  No sonographic Richards sign. No pericholecystic fluid. The common duct is not dilated, measuring 3.4 mm. No intrahepatic ductal dilatation.    Pancreas: The visualized portions of pancreas appear normal    Right kidney: Normal in size measuring 11.3 with no hydronephrosis.    Miscellaneous: No ascites.  Impression: 1. Diffuse Fatty infiltration of the liver.  .    Electronically signed by: Messi Najera DO  Date:    08/07/2019  Time:    11:21        Review of Systems   Constitutional: Positive for activity change and appetite change. Negative for chills and fatigue.        Intentional weight loss   HENT: Negative for sore throat and trouble swallowing.    Respiratory: Negative for cough and shortness of breath.    Cardiovascular: Negative for chest pain and leg swelling.   Gastrointestinal: Negative for abdominal pain, constipation, diarrhea and nausea.   Genitourinary: Negative for difficulty urinating, dysuria and flank pain.   Musculoskeletal: Negative for arthralgias and joint swelling.   Neurological: Negative for dizziness and headaches.   Psychiatric/Behavioral: Positive for sleep disturbance.     Objective:     Vitals:    11/07/19 1621   Weight: 135.2 kg (298 lb)   Height: 5' 9" (1.753 m)     Physical Exam   Constitutional: He appears well-developed and well-nourished. He is active and " cooperative. He does not have a sickly appearance. He does not appear ill. No distress.   Morbid obese white male pleasant   HENT:   Head: Normocephalic and atraumatic.   Neurological: He is alert.   Psychiatric: He has a normal mood and affect. His behavior is normal. Judgment and thought content normal. His mood appears not anxious. His affect is not angry, not blunt, not labile and not inappropriate. His speech is not rapid and/or pressured, not delayed, not tangential and not slurred. He is not agitated, not aggressive, not hyperactive, not slowed, not withdrawn, not actively hallucinating and not combative. Thought content is not paranoid and not delusional. Cognition and memory are normal. Cognition and memory are not impaired. He does not express impulsivity or inappropriate judgment. He does not exhibit a depressed mood. He expresses no homicidal and no suicidal ideation. He expresses no suicidal plans and no homicidal plans. He is communicative. He exhibits normal recent memory and normal remote memory. He is attentive.   Vitals reviewed.    Assessment:     1. Insulin resistance    2. Fatty liver disease, nonalcoholic    3. Prediabetes    4. Morbid obesity with alveolar hypoventilation    5. Chronic intermittent hypoxia with obstructive sleep apnea      Plan:   Jamarcus was seen today for weight check, bp, labs.    Diagnoses and all orders for this visit:    Insulin resistance-no change in insulin levels however improvement with A1c and weight loss.  Comments:  weight is down. tolerating the metformin. below 300lbs. insulin levels the same.  a1c controlled.  smaller meals. cont with weight loss.   Orders:  -     Ambulatory referral to Sleep Disorders  -     CPAP FOR HOME USE  -     CPAP/BIPAP SUPPLIES  -     Hemoglobin A1c; Future  -     Insulin, random; Future  -     Comprehensive metabolic panel; Future  -     Lipid panel; Future  -     Gamma GT; Future    Fatty liver disease, nonalcoholic-elevated liver  enzymes stressed importance of trying not to 7 times his diet with his lunchtime meal with a fatty  choice.  Discussed healthier options.  Repeat lab work prior to next visit  -     Ambulatory referral to Sleep Disorders  -     CPAP FOR HOME USE  -     CPAP/BIPAP SUPPLIES  -     Hemoglobin A1c; Future  -     Insulin, random; Future  -     Comprehensive metabolic panel; Future  -     Lipid panel; Future  -     Gamma GT; Future    Prediabetes-improved with metformin and weight loss continue on further weight loss and dietary changes continue with medications  -     Ambulatory referral to Sleep Disorders  -     CPAP FOR HOME USE  -     CPAP/BIPAP SUPPLIES  -     Hemoglobin A1c; Future  -     Insulin, random; Future  -     Comprehensive metabolic panel; Future  -     Lipid panel; Future  -     Gamma GT; Future    Morbid obesity with alveolar hypoventilation-steadily improving weight now under 300 lb continue to move for goal to get down additional 14-15 more lb in the next 8 weeks.  -     Ambulatory referral to Sleep Disorders  -     CPAP FOR HOME USE  -     CPAP/BIPAP SUPPLIES  -     Hemoglobin A1c; Future  -     Insulin, random; Future  -     Comprehensive metabolic panel; Future  -     Lipid panel; Future  -     Gamma GT; Future    Chronic intermittent hypoxia with obstructive sleep apnea-noted on home sleep study will refer once again to the Sleep disorders Clinic will go ahead and placed orders for CPAP auto Pap with 6-20 cm of water pressure heated full face mask to use at home.  -     Ambulatory referral to Sleep Disorders  -     CPAP FOR HOME USE  -     CPAP/BIPAP SUPPLIES  -     Hemoglobin A1c; Future  -     Insulin, random; Future  -     Comprehensive metabolic panel; Future  -     Lipid panel; Future  -     Gamma GT; Future            Follow up in about 7 weeks (around 12/26/2019) for f/u weight, insulin, liver- telemed .    There are no Patient Instructions on file for this visit.

## 2019-11-20 ENCOUNTER — PATIENT MESSAGE (OUTPATIENT)
Dept: PULMONOLOGY | Facility: CLINIC | Age: 34
End: 2019-11-20

## 2019-12-11 ENCOUNTER — OFFICE VISIT (OUTPATIENT)
Dept: SLEEP MEDICINE | Facility: CLINIC | Age: 34
End: 2019-12-11
Payer: COMMERCIAL

## 2019-12-11 VITALS
DIASTOLIC BLOOD PRESSURE: 80 MMHG | RESPIRATION RATE: 18 BRPM | SYSTOLIC BLOOD PRESSURE: 118 MMHG | HEART RATE: 70 BPM | WEIGHT: 299.63 LBS | HEIGHT: 69 IN | OXYGEN SATURATION: 95 % | BODY MASS INDEX: 44.38 KG/M2

## 2019-12-11 DIAGNOSIS — E66.01 MORBID OBESITY WITH BMI OF 40.0-44.9, ADULT: ICD-10-CM

## 2019-12-11 DIAGNOSIS — E66.2 MORBID OBESITY WITH ALVEOLAR HYPOVENTILATION: ICD-10-CM

## 2019-12-11 DIAGNOSIS — G47.34 CHRONIC INTERMITTENT HYPOXIA WITH OBSTRUCTIVE SLEEP APNEA: ICD-10-CM

## 2019-12-11 DIAGNOSIS — G47.33 OSA (OBSTRUCTIVE SLEEP APNEA): Primary | ICD-10-CM

## 2019-12-11 DIAGNOSIS — G47.33 CHRONIC INTERMITTENT HYPOXIA WITH OBSTRUCTIVE SLEEP APNEA: ICD-10-CM

## 2019-12-11 PROCEDURE — 99244 OFF/OP CNSLTJ NEW/EST MOD 40: CPT | Mod: S$GLB,,, | Performed by: NURSE PRACTITIONER

## 2019-12-11 PROCEDURE — 99999 PR PBB SHADOW E&M-EST. PATIENT-LVL IV: ICD-10-PCS | Mod: PBBFAC,,, | Performed by: NURSE PRACTITIONER

## 2019-12-11 PROCEDURE — 99999 PR PBB SHADOW E&M-EST. PATIENT-LVL IV: CPT | Mod: PBBFAC,,, | Performed by: NURSE PRACTITIONER

## 2019-12-11 PROCEDURE — 99244 PR OFFICE CONSULTATION,LEVEL IV: ICD-10-PCS | Mod: S$GLB,,, | Performed by: NURSE PRACTITIONER

## 2019-12-11 NOTE — PROGRESS NOTES
"Subjective:      Patient ID: Jamarcus Dorsey is a 34 y.o. male.    Chief Complaint: Sleep Apnea    HPI  Patient presents to the office today for evaluation of sleep apnea.  Patient with snoring and witnessed apneas. Patient has problems getting to sleep and wakes up frequently throughout the night.  Patient does not wake up feeling refreshed in the morning.  Patient with daytime hypersomnolence.  Geary Sleepiness Scale score 8.  Comorbidities include BMI 44, insulin resistance.   Bedtime: 1AM  Wake time: 9:30AM  He is working on weight loss. 30lb weight loss.     Patient Active Problem List   Diagnosis    Chronic intermittent hypoxia with obstructive sleep apnea    Insulin resistance    Fatty liver disease, nonalcoholic    Prediabetes    Morbid obesity with alveolar hypoventilation           /80   Pulse 70   Resp 18   Ht 5' 9" (1.753 m)   Wt 135.9 kg (299 lb 9.7 oz)   SpO2 95%   BMI 44.24 kg/m²   Body mass index is 44.24 kg/m².    Review of Systems   Respiratory: Positive for somnolence.    Psychiatric/Behavioral: Positive for sleep disturbance.   All other systems reviewed and are negative.    Objective:      Physical Exam   Constitutional: He is oriented to person, place, and time. He appears well-developed and well-nourished.   Obese   HENT:   Head: Normocephalic and atraumatic.   Mallampati Score: III   Eyes: Pupils are equal, round, and reactive to light. EOM are normal.   Neck: Normal range of motion. Neck supple.   Neck circumference:19 inches    Cardiovascular: Normal rate and regular rhythm. Exam reveals no gallop and no friction rub.   No murmur heard.  Pulmonary/Chest: Effort normal and breath sounds normal.   Abdominal: Soft. There is no guarding.   Musculoskeletal: Normal range of motion.   Neurological: He is alert and oriented to person, place, and time.   Skin: Skin is warm and dry.   Psychiatric: He has a normal mood and affect.     Personal Diagnostic Review  Assessment and " Recommendations  HST  1 night study  SEVERE OBSTRUCTIVE SLEEP APNEA with overall AHI 91.5/hr ( 389 events):  Oxygen desaturation: 74%. SpO2 between 70% to 79% for 3 min.  SpO2 was below 90% for 165 of the study time  Patient snored 100% time above 50 .  Heart rate range: 51 bpm - 100 bpm  REC's:  INLAB CPAP titration recommended.  Assessment:       1. DAVID (obstructive sleep apnea)    2. Morbid obesity with alveolar hypoventilation    3. Chronic intermittent hypoxia with obstructive sleep apnea    4. Morbid obesity with BMI of 40.0-44.9, adult        Outpatient Encounter Medications as of 12/11/2019   Medication Sig Dispense Refill    metFORMIN (GLUCOPHAGE-XR) 500 MG 24 hr tablet 1 tab po daily x 1wk, 2 tab po daily x 1wk, 3 tab po daily x 1wk, 4 tab po daily (Patient taking differently: Take 1,000 mg by mouth 2 (two) times daily with meals. 1 tab po daily x 1wk, 2 tab po daily x 1wk, 3 tab po daily x 1wk, 4 tab po daily) 120 tablet 2     No facility-administered encounter medications on file as of 12/11/2019.      Orders Placed This Encounter   Procedures    CPAP Titration (Must have dx of DAVID from previous sleep study.)     Standing Status:   Future     Standing Expiration Date:   12/10/2020     Plan:   Inlab CPAP titration due to severity of DAVID- AHI 91 events/hr, hypoxia and BMI     Problem List Items Addressed This Visit        Other    Chronic intermittent hypoxia with obstructive sleep apnea    Morbid obesity with alveolar hypoventilation      Other Visit Diagnoses     DAVID (obstructive sleep apnea)    -  Primary    Relevant Orders    CPAP Titration (Must have dx of DAVID from previous sleep study.)    Morbid obesity with BMI of 40.0-44.9, adult

## 2019-12-11 NOTE — LETTER
December 11, 2019      Ibeth Bond MD  [address]  [address]  [place] [address]           [facility]  [address]  [place] [address]  Phone: [phone]  Fax: [phone]          Patient: Jamarcus Dorsey   MR Number: [id number]   YOB: 1985   Date of Visit: 12/11/2019       Dear Dr. Ibeth Bond:    Thank you for referring Jamarcus Dorsey to me for evaluation. Attached you will find relevant portions of my assessment and plan of care.    If you have questions, please do not hesitate to call me. I look forward to following Jamarcus Dorsey along with you.    Sincerely,    Elizabeth Lejeune, NP    Enclosure  CC:  No Recipients    If you would like to receive this communication electronically, please contact [email or web address] or [phone] to request more information on [facility] Link access.    For providers and/or their staff who would like to refer a patient to [facility], please contact us through our one-stop-shop provider referral line, [facility] , at [phone].    If you feel you have received this communication in error or would no longer like to receive these types of communications, please e-mail [email or web address]

## 2019-12-18 ENCOUNTER — PATIENT OUTREACH (OUTPATIENT)
Dept: ADMINISTRATIVE | Facility: HOSPITAL | Age: 34
End: 2019-12-18

## 2019-12-18 NOTE — PROGRESS NOTES
HM reviewed.   Immunizations abstracted. CC note added.  Care Everywhere abstracted. No new results found.  Health Maintenance Due   Topic    TETANUS VACCINE     Pneumococcal Vaccine (Medium Risk) (1 of 1 - PPSV23)     Previsit chart audit completed.  *KDL*     yes

## 2020-02-26 ENCOUNTER — TELEPHONE (OUTPATIENT)
Dept: INTERNAL MEDICINE | Facility: CLINIC | Age: 35
End: 2020-02-26

## 2020-03-03 ENCOUNTER — TELEPHONE (OUTPATIENT)
Dept: INTERNAL MEDICINE | Facility: CLINIC | Age: 35
End: 2020-03-03

## 2020-11-04 ENCOUNTER — PATIENT OUTREACH (OUTPATIENT)
Dept: ADMINISTRATIVE | Facility: HOSPITAL | Age: 35
End: 2020-11-04

## 2020-11-04 NOTE — PROGRESS NOTES
Working [employer] [place] HTN report.  Attempted to call pt to schedule overdue annual with dr moore as well as obtain remote bp reading. Last bp reading is from 8/2019. No answer, left vm. Entered myc9 order.

## 2021-01-27 ENCOUNTER — PATIENT OUTREACH (OUTPATIENT)
Dept: ADMINISTRATIVE | Facility: HOSPITAL | Age: 36
End: 2021-01-27

## 2021-02-22 ENCOUNTER — PATIENT OUTREACH (OUTPATIENT)
Dept: ADMINISTRATIVE | Facility: HOSPITAL | Age: 36
End: 2021-02-22

## 2021-03-08 ENCOUNTER — PATIENT OUTREACH (OUTPATIENT)
Dept: ADMINISTRATIVE | Facility: HOSPITAL | Age: 36
End: 2021-03-08

## 2021-03-25 ENCOUNTER — PATIENT MESSAGE (OUTPATIENT)
Dept: ADMINISTRATIVE | Facility: HOSPITAL | Age: 36
End: 2021-03-25

## 2021-03-29 ENCOUNTER — PATIENT OUTREACH (OUTPATIENT)
Dept: ADMINISTRATIVE | Facility: HOSPITAL | Age: 36
End: 2021-03-29

## 2021-04-28 ENCOUNTER — PATIENT MESSAGE (OUTPATIENT)
Dept: RESEARCH | Facility: HOSPITAL | Age: 36
End: 2021-04-28

## 2021-10-05 ENCOUNTER — PATIENT OUTREACH (OUTPATIENT)
Dept: ADMINISTRATIVE | Facility: HOSPITAL | Age: 36
End: 2021-10-05

## 2021-10-05 ENCOUNTER — TELEPHONE (OUTPATIENT)
Dept: PRIMARY CARE CLINIC | Facility: CLINIC | Age: 36
End: 2021-10-05

## 2022-04-12 NOTE — PROGRESS NOTES
Assessment and Recommendations  1 night study  SEVERE OBSTRUCTIVE SLEEP APNEA with overall AHI 91.5/hr ( 389 events):  Oxygen desaturation: 74%. SpO2 between 70% to 79% for 3 min.  SpO2 was below 90% for 165 of the study time  Patient snored 100% time above 50 .  Heart rate range: 51 bpm - 100 bpm  REC's:  INLAB CPAP titration recommended.  Therapy with APAP at 6-20 cm WP using mask of choice with heated humidification is an option.  Referred to [facility] Disorder Clinic for prompt evaluation and management  Weight loss/management. with regular exercise per direction of physician.  Avoid drowsy driving.  Follow up in sleep clinic to maximize adherence and ensure resolution of symptoms.   Patient was placed on Devilbiss, but couldn't tolerate it. Patient requested to be placed back on his home unit. Pt is stable and resting.

## 2022-04-27 ENCOUNTER — PATIENT MESSAGE (OUTPATIENT)
Dept: ADMINISTRATIVE | Facility: HOSPITAL | Age: 37
End: 2022-04-27
Payer: COMMERCIAL

## 2022-07-20 ENCOUNTER — PATIENT OUTREACH (OUTPATIENT)
Dept: ADMINISTRATIVE | Facility: HOSPITAL | Age: 37
End: 2022-07-20
Payer: COMMERCIAL

## 2022-10-28 ENCOUNTER — PATIENT OUTREACH (OUTPATIENT)
Dept: ADMINISTRATIVE | Facility: HOSPITAL | Age: 37
End: 2022-10-28
Payer: COMMERCIAL

## 2022-11-10 ENCOUNTER — PATIENT OUTREACH (OUTPATIENT)
Dept: ADMINISTRATIVE | Facility: HOSPITAL | Age: 37
End: 2022-11-10
Payer: COMMERCIAL